# Patient Record
Sex: FEMALE | Race: WHITE | NOT HISPANIC OR LATINO | URBAN - METROPOLITAN AREA
[De-identification: names, ages, dates, MRNs, and addresses within clinical notes are randomized per-mention and may not be internally consistent; named-entity substitution may affect disease eponyms.]

---
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
## 2023-04-03 ENCOUNTER — COMPREHENSIVE EXAM (OUTPATIENT)
Dept: URBAN - METROPOLITAN AREA CLINIC 31 | Facility: CLINIC | Age: 18
End: 2023-04-03

## 2023-04-03 DIAGNOSIS — H52.13: ICD-10-CM

## 2023-04-03 PROCEDURE — 92015 DETERMINE REFRACTIVE STATE: CPT

## 2023-04-03 PROCEDURE — 92014 COMPRE OPH EXAM EST PT 1/>: CPT

## 2023-04-03 PROCEDURE — 92310-3 LEVEL 3 CONTACT LENS MANAGEMENT

## 2023-04-03 ASSESSMENT — VISUAL ACUITY
OS_CC: 20/25
OD_CC: 20/25
OD_CC: J1+
OS_CC: J1+

## 2023-04-20 ENCOUNTER — CONTACT LENSES/GLASSES VISIT (OUTPATIENT)
Dept: URBAN - METROPOLITAN AREA CLINIC 31 | Facility: CLINIC | Age: 18
End: 2023-04-20

## 2023-04-20 DIAGNOSIS — H52.13: ICD-10-CM

## 2023-04-20 PROCEDURE — 92310F

## 2023-04-20 ASSESSMENT — VISUAL ACUITY
OD_CC: 20/20
OU_CC: 20/20
OS_CC: 20/25

## 2023-05-04 ENCOUNTER — CONTACT LENSES/GLASSES VISIT (OUTPATIENT)
Dept: URBAN - METROPOLITAN AREA CLINIC 31 | Facility: CLINIC | Age: 18
End: 2023-05-04

## 2023-05-04 DIAGNOSIS — Z97.3: ICD-10-CM

## 2023-05-04 DIAGNOSIS — H52.13: ICD-10-CM

## 2023-05-04 PROCEDURE — 92310F

## 2023-05-04 ASSESSMENT — VISUAL ACUITY
OS_CC: 20/20
OD_CC: 20/20